# Patient Record
Sex: FEMALE | Race: WHITE | Employment: PART TIME | URBAN - METROPOLITAN AREA
[De-identification: names, ages, dates, MRNs, and addresses within clinical notes are randomized per-mention and may not be internally consistent; named-entity substitution may affect disease eponyms.]

---

## 2017-01-09 ENCOUNTER — HOSPITAL ENCOUNTER (OUTPATIENT)
Age: 32
Discharge: HOME | End: 2017-01-09
Attending: EMERGENCY MEDICINE
Payer: MEDICAID

## 2017-01-09 VITALS
TEMPERATURE: 98 F | RESPIRATION RATE: 20 BRPM | HEIGHT: 67 IN | OXYGEN SATURATION: 100 % | DIASTOLIC BLOOD PRESSURE: 81 MMHG | HEART RATE: 80 BPM | BODY MASS INDEX: 35.79 KG/M2 | SYSTOLIC BLOOD PRESSURE: 139 MMHG | WEIGHT: 228 LBS

## 2017-01-09 DIAGNOSIS — J01.90 ACUTE SINUSITIS, RECURRENCE NOT SPECIFIED, UNSPECIFIED LOCATION: Primary | ICD-10-CM

## 2017-01-09 PROCEDURE — 99214 OFFICE O/P EST MOD 30 MIN: CPT

## 2017-01-09 PROCEDURE — 99213 OFFICE O/P EST LOW 20 MIN: CPT

## 2017-01-09 RX ORDER — CEFUROXIME AXETIL 500 MG/1
500 TABLET ORAL 2 TIMES DAILY
Qty: 14 TABLET | Refills: 0 | Status: SHIPPED | OUTPATIENT
Start: 2017-01-09 | End: 2017-01-16

## 2017-01-09 NOTE — ED INITIAL ASSESSMENT (HPI)
Sick since 12/26 with sinus congestion. Mild cough. No fever. Bloody drainage at times. Frontal headache, facial pressure. No dizziness. + nausea. No vomiting.

## 2017-01-09 NOTE — ED PROVIDER NOTES
Patient Seen in: [address]    History   Patient presents with:  Sinus Problem    Stated Complaint: sinus problem    HPI    Patient is 68-year-old female that complains of 14 days of sinus congestion.   There has been no f Cholecalciferol (VITAMIN D) 2000 UNITS Oral Cap,  Take by mouth. 5-Hydroxytryptophan (5-HTP) 100 MG Oral Cap,  Take 2 capsules by mouth daily. TURMERIC OR,  Take  by mouth daily.    NON FORMULARY,  CBD oil, vapor, smoked to help with pain and inflammati round, and reactive to light. Neck: Neck supple. Cardiovascular: Normal rate, regular rhythm, normal heart sounds and intact distal pulses. Pulmonary/Chest: Effort normal and breath sounds normal. No respiratory distress. Abdominal: Soft.  Bowel so

## 2017-01-12 RX ORDER — LOSARTAN POTASSIUM 100 MG/1
TABLET ORAL
Qty: 30 TABLET | Refills: 0 | Status: SHIPPED | OUTPATIENT
Start: 2017-01-12 | End: 2017-02-13

## 2017-01-12 NOTE — TELEPHONE ENCOUNTER
. please advise as does not meet RN protocol for refill criteria- lov date         Hypertensive Medications  Protocol Criteria:  · Appointment scheduled in the past 6 months or in the next 3 months  · BMP or CMP in the past 12 months  · Creatinine result <

## 2017-01-18 ENCOUNTER — TELEPHONE (OUTPATIENT)
Dept: OBGYN CLINIC | Facility: CLINIC | Age: 32
End: 2017-01-18

## 2017-01-31 NOTE — TELEPHONE ENCOUNTER
PT DOES HAVE AN ANNUAL SCHEDULED WITH [facility] ON 3-1-17. LEFT MESSAGE TO CALL US BACK ONLY IF SHE HAS ANY ISSUES THAT NEED TO BE ADDRESSED BEFORE HER 3-1-17 APPT OR TO CALL BACK WITH ANY QUESTIONS.

## 2017-02-06 ENCOUNTER — TELEPHONE (OUTPATIENT)
Dept: INTERNAL MEDICINE CLINIC | Facility: CLINIC | Age: 32
End: 2017-02-06

## 2017-02-06 ENCOUNTER — TELEPHONE (OUTPATIENT)
Dept: OBGYN CLINIC | Facility: CLINIC | Age: 32
End: 2017-02-06

## 2017-02-06 DIAGNOSIS — N92.6 IRREGULAR MENSES: Primary | ICD-10-CM

## 2017-02-06 RX ORDER — NORETHINDRONE ACETATE AND ETHINYL ESTRADIOL .03; 1.5 MG/1; MG/1
1 TABLET ORAL DAILY
Qty: 28 TABLET | Refills: 0 | Status: SHIPPED | OUTPATIENT
Start: 2017-02-06 | End: 2017-02-13

## 2017-02-06 NOTE — TELEPHONE ENCOUNTER
Pt's last annual was on 2/15/16 with a normal pap. Pt has appt for annual on 3/1/17. One month sent to pharmacy to cover until appt with [facility]. Pt informed.

## 2017-02-06 NOTE — TELEPHONE ENCOUNTER
Current Outpatient Prescriptions:  Norethindrone Acet-Ethinyl Est (Ashley Pinto 1.5/30) 1.5-30 MG-MCG Oral Tab Take 1 tablet by mouth once daily.  Disp: 3 Package Rfl: 3   ANNUAL 3-1-17

## 2017-02-13 ENCOUNTER — OFFICE VISIT (OUTPATIENT)
Dept: INTERNAL MEDICINE CLINIC | Facility: CLINIC | Age: 32
End: 2017-02-13

## 2017-02-13 VITALS
HEART RATE: 101 BPM | BODY MASS INDEX: 36.16 KG/M2 | SYSTOLIC BLOOD PRESSURE: 137 MMHG | WEIGHT: 230.38 LBS | RESPIRATION RATE: 18 BRPM | HEIGHT: 67 IN | DIASTOLIC BLOOD PRESSURE: 91 MMHG

## 2017-02-13 DIAGNOSIS — I10 ESSENTIAL HYPERTENSION: Primary | ICD-10-CM

## 2017-02-13 DIAGNOSIS — J45.20 MILD INTERMITTENT ASTHMA WITHOUT COMPLICATION: ICD-10-CM

## 2017-02-13 DIAGNOSIS — M79.7 FIBROMYALGIA: ICD-10-CM

## 2017-02-13 DIAGNOSIS — F17.200 TOBACCO USE DISORDER: ICD-10-CM

## 2017-02-13 PROCEDURE — 99212 OFFICE O/P EST SF 10 MIN: CPT | Performed by: INTERNAL MEDICINE

## 2017-02-13 PROCEDURE — 99214 OFFICE O/P EST MOD 30 MIN: CPT | Performed by: INTERNAL MEDICINE

## 2017-02-13 RX ORDER — LOSARTAN POTASSIUM 100 MG/1
100 TABLET ORAL
Qty: 30 TABLET | Refills: 5 | Status: SHIPPED | OUTPATIENT
Start: 2017-02-13 | End: 2017-08-15

## 2017-02-13 RX ORDER — CARISOPRODOL 250 MG/1
1 TABLET ORAL NIGHTLY PRN
Qty: 30 TABLET | Refills: 3 | Status: SHIPPED | OUTPATIENT
Start: 2017-02-13

## 2017-02-13 RX ORDER — ALBUTEROL SULFATE 90 UG/1
2 AEROSOL, METERED RESPIRATORY (INHALATION) EVERY 6 HOURS PRN
Qty: 1 INHALER | Refills: 5 | Status: SHIPPED | OUTPATIENT
Start: 2017-02-13

## 2017-02-13 NOTE — PROGRESS NOTES
HPI:    Patient ID: Ralf Moran is a 36year old female. HPI Comments: She had a bad flare of her fibromyalgia last week. She hurt everywhere. She took ibuprofen  1000 mg twice a day for a few days. Fibromyalgia  This is a chronic problem.  The Sulfate HFA (VENTOLIN HFA) 108 (90 BASE) MCG/ACT Inhalation Aero Soln Inhale 2 puffs into the lungs every 6 (six) hours as needed for Wheezing.  Disp: 1 Inhaler Rfl: 0   Norethindrone Acet-Ethinyl Est (Kathryn Sensing 1.5/30) 1.5-30 MG-MCG Oral Tab Take 1 tablet by m the risk of all cancers. Advised that I would give medication to help her quit, if desired. Patient is unable to set a quit date at this time, but wants to try weaning.          [id number]  Discussed tobacco cessation with patient for more than 3 minutes and

## 2017-02-13 NOTE — PATIENT INSTRUCTIONS
Please rank your cigarettes from most to least important (ex: 1-10).   Give up least important cigarette every 3 days to gradually wean down the number.  (ex:  Give up 10th for 7 days, then 9th for 7 days.)      Getting Support for Quitting Smoking  You don Sometimes you may just need to talk when you miss smoking. Ex-smokers are good to talk to, because they’re likely to know how you feel. You may need extra support in the first few weeks after you quit.  Ask a friend to call you each day to see how you’re do

## 2017-02-17 ENCOUNTER — TELEPHONE (OUTPATIENT)
Dept: INTERNAL MEDICINE CLINIC | Facility: CLINIC | Age: 32
End: 2017-02-17

## 2017-02-17 NOTE — TELEPHONE ENCOUNTER
Pt. States that prior Rebekah Heman is needed for [facility] medication/muscle relaxer. Pt. Requesting to get a call back to find out when Rebekah Heman is given. Zoey Myles

## 2017-03-01 ENCOUNTER — OFFICE VISIT (OUTPATIENT)
Dept: OBGYN CLINIC | Facility: CLINIC | Age: 32
End: 2017-03-01

## 2017-03-01 VITALS
WEIGHT: 232 LBS | BODY MASS INDEX: 36 KG/M2 | SYSTOLIC BLOOD PRESSURE: 139 MMHG | HEART RATE: 111 BPM | DIASTOLIC BLOOD PRESSURE: 94 MMHG

## 2017-03-01 DIAGNOSIS — Z79.3 AMENORRHEA DUE TO ORAL CONTRACEPTIVE: ICD-10-CM

## 2017-03-01 DIAGNOSIS — N91.2 AMENORRHEA DUE TO ORAL CONTRACEPTIVE: ICD-10-CM

## 2017-03-01 DIAGNOSIS — Z01.419 ENCOUNTER FOR GYNECOLOGICAL EXAMINATION WITHOUT ABNORMAL FINDING: Primary | ICD-10-CM

## 2017-03-01 PROCEDURE — 99395 PREV VISIT EST AGE 18-39: CPT | Performed by: OBSTETRICS & GYNECOLOGY

## 2017-03-01 RX ORDER — NORETHINDRONE ACETATE AND ETHINYL ESTRADIOL .03; 1.5 MG/1; MG/1
1 TABLET ORAL
Qty: 3 PACKAGE | Refills: 3 | Status: SHIPPED | OUTPATIENT
Start: 2017-03-01 | End: 2017-03-06

## 2017-03-01 RX ORDER — METHOCARBAMOL 500 MG/1
500 TABLET, FILM COATED ORAL 4 TIMES DAILY
Qty: 30 TABLET | Refills: 1 | Status: SHIPPED | OUTPATIENT
Start: 2017-03-01 | End: 2017-09-01

## 2017-03-01 NOTE — TELEPHONE ENCOUNTER
Pt calling on PA  Said LV also prescribed another medication for muscle spasms-did not know name of medication  Said ins  requires PA for this medicaiton too

## 2017-03-01 NOTE — TELEPHONE ENCOUNTER
Call ptIdalia Altamirano is not covered by her insurance. I changed it to Robaxin. Please schedule a f/u appointment if the Robaxin is not effective.

## 2017-03-01 NOTE — TELEPHONE ENCOUNTER
Soma denied. Plan states use preferred drug list medications; parafon forte, baclofen, robaxin, or zanaflex.

## 2017-03-01 NOTE — TELEPHONE ENCOUNTER
Spoke with patient advised her Beryl Code was denied. Patient advised new RX for Robaxin was sent to her pharmacy. Patient voiced her understanding of this.

## 2017-03-06 RX ORDER — NORETHINDRONE ACETATE AND ETHINYL ESTRADIOL .03; 1.5 MG/1; MG/1
1 TABLET ORAL
Qty: 3 PACKAGE | Refills: 3 | Status: SHIPPED | OUTPATIENT
Start: 2017-03-06

## 2017-03-06 NOTE — PROGRESS NOTES
Rahul Medina is a 36year old female [facility] Patient's last menstrual period was 12/08/2016.   Patient presents with:  Gyn Exam: annual  she does not have menses on ocps and we discussed that this is a common side effect and reassured her that I would l average. Drug Use: Yes     Comment: marijuana to help with back pain    Sexual Activity: Yes    Partners: Male    Birth Control/ Protection: Condom, OCP     Other Topics Concern    Caffeine Concern No    Comment: 1 cup coffee daily.  1 large cup tea andrés Disp: , Rfl:     ALLERGIES:    Amoclan                 Diarrhea  Guaifenesin             Nausea and vomiting  Peach                     Peanuts                       Review of Systems:  Constitutional:  Denies fatigue, night sweats, hot flashes  Eyes:  den without tenderness  Adnexa: normal without masses or tenderness, exam limited by body habitus  Perineum: normal  Anus: no hemorroids     Assessment & Plan:    Encounter for gynecological examination without abnormal finding  (primary encounter diagnosis)

## 2017-07-18 ENCOUNTER — HOSPITAL (OUTPATIENT)
Dept: OTHER | Age: 32
End: 2017-07-18
Attending: EMERGENCY MEDICINE

## 2017-08-14 DIAGNOSIS — I10 ESSENTIAL HYPERTENSION: ICD-10-CM

## 2017-08-15 RX ORDER — LOSARTAN POTASSIUM 100 MG/1
TABLET ORAL
Qty: 30 TABLET | Refills: 0 | Status: SHIPPED | OUTPATIENT
Start: 2017-08-15 | End: 2017-09-17

## 2017-09-05 RX ORDER — METHOCARBAMOL 500 MG/1
500 TABLET, FILM COATED ORAL 4 TIMES DAILY
Qty: 30 TABLET | Refills: 0 | Status: SHIPPED
Start: 2017-09-05

## 2017-09-05 NOTE — TELEPHONE ENCOUNTER
From: Yamil Baldwin  Sent: 9/1/2017 8:01 PM CDT  Subject: Medication Renewal Request    Yamil Baldwin would like a refill of the following medications:  methocarbamol (ROBAXIN) 500 MG Oral Tab Kami Landa MD]    Preferred pharmacy: [facility]/PHARMACY

## 2017-09-05 NOTE — TELEPHONE ENCOUNTER
Refill Protocol Appointment Criteria  · Appointment scheduled in the past 6 months or in the next 3 months  Recent Outpatient Visits            6 months ago Encounter for gynecological examination without abnormal finding    [facility]

## 2017-09-06 ENCOUNTER — OFFICE VISIT (OUTPATIENT)
Dept: FAMILY MEDICINE CLINIC | Facility: CLINIC | Age: 32
End: 2017-09-06

## 2017-09-06 VITALS
HEART RATE: 84 BPM | OXYGEN SATURATION: 98 % | DIASTOLIC BLOOD PRESSURE: 84 MMHG | SYSTOLIC BLOOD PRESSURE: 146 MMHG | TEMPERATURE: 98 F | RESPIRATION RATE: 16 BRPM

## 2017-09-06 DIAGNOSIS — Z20.818 STREP THROAT EXPOSURE: ICD-10-CM

## 2017-09-06 DIAGNOSIS — I10 HYPERTENSION, UNSPECIFIED TYPE: ICD-10-CM

## 2017-09-06 DIAGNOSIS — J02.9 SORE THROAT: Primary | ICD-10-CM

## 2017-09-06 DIAGNOSIS — J01.00 ACUTE MAXILLARY SINUSITIS, RECURRENCE NOT SPECIFIED: ICD-10-CM

## 2017-09-06 LAB
CONTROL LINE PRESENT WITH A CLEAR BACKGROUND (YES/NO): YES YES/NO
STREP GRP A CUL-SCR: NEGATIVE

## 2017-09-06 PROCEDURE — 99213 OFFICE O/P EST LOW 20 MIN: CPT | Performed by: NURSE PRACTITIONER

## 2017-09-06 PROCEDURE — 87880 STREP A ASSAY W/OPTIC: CPT | Performed by: NURSE PRACTITIONER

## 2017-09-06 RX ORDER — CEFDINIR 300 MG/1
CAPSULE ORAL
Qty: 20 CAPSULE | Refills: 0 | Status: SHIPPED | OUTPATIENT
Start: 2017-09-06

## 2017-09-06 NOTE — PATIENT INSTRUCTIONS
When You Have a Sore Throat  A sore throat can be painful. There are many reasons why you may have a sore throat. Your healthcare provider will work with you to find the cause of your sore throat. He or she will also find the best treatment for you. During the exam, your healthcare provider checks your ears, nose, and throat for problems.  He or she also checks for swelling in the neck, and may listen to your chest.  Possible tests  Other tests your healthcare provider may perform include:  · A throat If your sore throat is due to a bacterial infection, antibiotics may speed healing and prevent complications.  Although group A streptococcus (\"strep throat\" or GAS) is the major treatable infection for a sore throat, GAS causes only 5% to 15% of sore thr © 0182-1504 [address], [address] [place]. All rights reserved. This information is not intended as a substitute for professional medical care. Always follow your healthcare professional's instructions.         Sinusit · Over-the-counter decongestants may be used unless a similar medicine was prescribed. Nasal sprays work the fastest. Use one that contains phenylephrine or oxymetazoline. First blow the nose gently. Then use the spray.  Do not use these medicines more ofte © 5498-9022 [address], [address] [place]. All rights reserved. This information is not intended as a substitute for professional medical care. Always follow your healthcare professional's instructions.

## 2017-09-06 NOTE — PROGRESS NOTES
CHIEF COMPLAINT:   Patient presents with:  Sinus Problem: Sinus symptoms, sore throat, exposure to strep. Sxs x1 week. HPI:   Rubio Leija is a 29year old female who presents for sore throat, sinus symptoms for  1  weeks.  Symptoms have been wors Albuterol Sulfate HFA (VENTOLIN HFA) 108 (90 Base) MCG/ACT Inhalation Aero Soln Inhale 2 puffs into the lungs every 6 (six) hours as needed for Wheezing.  Disp: 1 Inhaler Rfl: 5   Carisoprodol (SOMA) 250 MG Oral Tab Take 1 tablet (250 mg total) by mouth nig SKIN: no rashes or abnormal skin lesions  HEENT: See HPI. LUNGS: denies shortness of breath or wheezing, See HPI  CARDIOVASCULAR: denies chest pain or palpitations   GI: denies N/V/C or abdominal pain  NEURO: No numbness or tingling in face.     EXAM: - Advised F/U visit if no improvement/worsening within 3-5 days; sooner if new fever or worsening breathing. To ER if ever dyspnea, SOB, chest pain, severe HA, or vision change. - Pt verbalizes understanding and is agreeable w/ plan.     Meds & Refills fo Your healthcare provider may ask you the following:  · How long has the sore throat lasted and how have you been treating it? · Do you have any other symptoms, such as body aches, fever, or cough? · Does your sore throat recur? If so, how often?  How many · Try over-the-counter pain relievers such as acetaminophen or ibuprofen. Use as directed, and don’t exceed the recommended dose. Don’t give aspirin to children. Are antibiotics needed?   If your sore throat is due to a bacterial infection, antibiotics ma · Symptoms don’t improve within 2 to 3 days of starting antibiotics. Date Last Reviewed: 10/1/2016  © 7589-8714 [address], [address] [place] [address]. All rights reserved.  This information is not intended as a substitute fo · Over-the-counter decongestants may be used unless a similar medicine was prescribed. Nasal sprays work the fastest. Use one that contains phenylephrine or oxymetazoline. First blow the nose gently. Then use the spray.  Do not use these medicines more ofte © 3540-5388 [address], [address] [place]. All rights reserved. This information is not intended as a substitute for professional medical care. Always follow your healthcare professional's instructions.

## 2017-09-17 DIAGNOSIS — I10 ESSENTIAL HYPERTENSION: ICD-10-CM

## 2017-09-19 DIAGNOSIS — I10 ESSENTIAL HYPERTENSION: ICD-10-CM

## 2017-09-19 RX ORDER — LOSARTAN POTASSIUM 100 MG/1
TABLET ORAL
Qty: 30 TABLET | Refills: 0 | Status: CANCELLED
Start: 2017-09-19

## 2017-09-21 RX ORDER — LOSARTAN POTASSIUM 100 MG/1
TABLET ORAL
Qty: 30 TABLET | Refills: 0 | Status: SHIPPED | OUTPATIENT
Start: 2017-09-21

## 2017-09-21 NOTE — TELEPHONE ENCOUNTER
Per last OV notes, was to come back in August for complete physical.   Chart reviewed. Refills sent per Triage Dept protocol. Will send patient reminder.    Hypertensive Medications  Protocol Criteria:  · Appointment scheduled in the past 6 months or in t

## 2017-09-21 NOTE — TELEPHONE ENCOUNTER
From: Carlos Eduardo Richard  Sent: 9/19/2017 10:11 AM CDT  Subject: Medication Renewal Request    Carlos Eduardo Richard would like a refill of the following medications:     losartan 100 MG Oral Tab Krista Diaz MD]    Preferred pharmacy: [facility]/PHARMACY #[id number] - [place]

## 2017-09-21 NOTE — TELEPHONE ENCOUNTER
From: Angi Yoon  Sent: 9/17/2017 7:01 AM CDT  Subject: Medication Renewal Request    Angi Yoon would like a refill of the following medications:     losartan 100 MG Oral Tab Cathie Swenson MD]    Preferred pharmacy: [facility]/PHARMACY #[id number] - [place]

## 2017-10-14 ENCOUNTER — PATIENT MESSAGE (OUTPATIENT)
Dept: OBGYN CLINIC | Facility: CLINIC | Age: 32
End: 2017-10-14

## 2017-10-16 NOTE — TELEPHONE ENCOUNTER
From: Yamil Baldwin  To: Sandi Childers MD  Sent: 10/14/2017 9:55 PM CDT  Subject: Prescription Question    Is there any way we could possibly try a different birth control I have still. been noticing alot of side effect and have not been having regula

## 2017-10-18 NOTE — TELEPHONE ENCOUNTER
Informed pt that [facility] sent a different ocp to her pharmacy for her  to start at the end of her current pack. Informed pt that [facility] explained to the pt at the visit the amenorrhea on the pill is common.   Informed pt that [facility] sent a birth control with more est

## 2017-10-18 NOTE — TELEPHONE ENCOUNTER
I sent an rx for a different ocp to her pharmacy for her to start at the end of her current pack.   I explained to pt at visit the amenorrhea on the pill is common in some women and no need for concern but I can give her a pill with more estrogen in it to t

## 2017-10-30 ENCOUNTER — HOSPITAL ENCOUNTER (EMERGENCY)
Facility: HOSPITAL | Age: 32
Discharge: HOME | End: 2017-10-30
Attending: EMERGENCY MEDICINE
Payer: COMMERCIAL

## 2017-10-30 VITALS
OXYGEN SATURATION: 98 % | HEIGHT: 67 IN | BODY MASS INDEX: 36.1 KG/M2 | SYSTOLIC BLOOD PRESSURE: 158 MMHG | WEIGHT: 230 LBS | DIASTOLIC BLOOD PRESSURE: 100 MMHG | HEART RATE: 87 BPM | TEMPERATURE: 98 F | RESPIRATION RATE: 18 BRPM

## 2017-10-30 DIAGNOSIS — J01.00 ACUTE NON-RECURRENT MAXILLARY SINUSITIS: Primary | ICD-10-CM

## 2017-10-30 PROCEDURE — 99283 EMERGENCY DEPT VISIT LOW MDM: CPT

## 2017-10-30 RX ORDER — AMOXICILLIN 500 MG/1
500 TABLET, FILM COATED ORAL 3 TIMES DAILY
Qty: 42 TABLET | Refills: 0 | Status: SHIPPED | OUTPATIENT
Start: 2017-10-30 | End: 2017-11-13

## 2017-10-30 NOTE — ED PROVIDER NOTES
Patient Seen in: [facility] AND [facility] Emergency Department    History   Patient presents with:  Cough/URI    Stated Complaint: nausea, headache    HPI    22-year-old female with history of fibromyalgia and hypertension presents with complaints of congestion average. Review of Systems    Positive for stated complaint: nausea, headache  Other systems are as noted in HPI. Constitutional and vital signs reviewed. All other systems reviewed and negative except as noted above.     PS[facility] elements reviewed f diagnosis)    Disposition:  Discharge    Follow-up:  Hanh Mcnamara [id number]  [phone]            Medications Prescribed:  Current Discharge Medication List    START taking these medications    amoxicillin 500 MG Oral

## 2017-11-09 ENCOUNTER — HOSPITAL (OUTPATIENT)
Dept: OTHER | Age: 32
End: 2017-11-09
Attending: EMERGENCY MEDICINE

## 2018-01-09 RX ORDER — NORETHINDRONE AND ETHINYL ESTRADIOL 1 MG-35MCG
1 KIT ORAL DAILY
Qty: 84 TABLET | Refills: 0 | OUTPATIENT
Start: 2018-01-09 | End: 2019-01-09

## 2018-01-09 NOTE — TELEPHONE ENCOUNTER
Regarding: RE: Prescription Question  Contact: [phone]  ----- Message from Karen Mcarthur RN sent at 1/8/2018  5:44 PM CST -----       ----- Message sent from Kofi Smith RN to Paul Luiz at 1/8/2018  5:43 PM -----   Fredis Cullen,    Glad to hear you are doing well while on the birth control. Your message will be sent to Dr. Darryle Lang as an [place] and will also ask if it is ok for refills to cover you until your next annual in March.     ----- Message -----     From: Paul Dee     Sent: 1/8/2018  4:12 PM CST       To: Jaiden Josue.  MD Omayra  Subject: Prescription Question    Hey you said to let you know how my bc is doing and I've definitely been feeling better on it

## 2018-01-09 NOTE — TELEPHONE ENCOUNTER
SEE MY CHART MESSAGE. LAST ANNUAL 3-1-17 AND LAST PAP 2-15-16. PT HAS [employer] INSURANCE. WILL YOU REFILL #84 AGAIN? RX PENDED TO [facility].

## 2019-04-24 ENCOUNTER — HOSPITAL (OUTPATIENT)
Dept: OTHER | Age: 34
End: 2019-04-24
Attending: EMERGENCY MEDICINE

## 2019-07-29 ENCOUNTER — HOSPITAL (OUTPATIENT)
Dept: OTHER | Age: 34
End: 2019-07-29

## 2019-07-29 LAB
ANALYZER ANC (IANC): ABNORMAL
ANION GAP SERPL CALC-SCNC: 9 MMOL/L (ref 10–20)
BASOPHILS # BLD: 0.1 K/MCL (ref 0–0.3)
BASOPHILS NFR BLD: 1 %
BUN SERPL-MCNC: 9 MG/DL (ref 6–20)
BUN/CREAT SERPL: 12 (ref 7–25)
CALCIUM SERPL-MCNC: 9.1 MG/DL (ref 8.4–10.2)
CHLORIDE SERPL-SCNC: 109 MMOL/L (ref 98–107)
CO2 SERPL-SCNC: 26 MMOL/L (ref 21–32)
CREAT SERPL-MCNC: 0.76 MG/DL (ref 0.51–0.95)
DIFFERENTIAL METHOD BLD: ABNORMAL
EOSINOPHIL # BLD: 0.2 K/MCL (ref 0.1–0.5)
EOSINOPHIL NFR BLD: 2 %
ERYTHROCYTE [DISTWIDTH] IN BLOOD: 13 % (ref 11–15)
GLUCOSE SERPL-MCNC: 94 MG/DL (ref 65–99)
HCT VFR BLD CALC: 43.4 % (ref 36–46.5)
HGB BLD-MCNC: 14.3 G/DL (ref 12–15.5)
IMM GRANULOCYTES # BLD AUTO: 0 K/MCL (ref 0–0.2)
IMM GRANULOCYTES NFR BLD: 0 %
LYMPHOCYTES # BLD: 1.2 K/MCL (ref 1–4.8)
LYMPHOCYTES NFR BLD: 12 %
MCH RBC QN AUTO: 29 PG (ref 26–34)
MCHC RBC AUTO-ENTMCNC: 32.9 G/DL (ref 32–36.5)
MCV RBC AUTO: 88 FL (ref 78–100)
MONOCYTES # BLD: 0.5 K/MCL (ref 0.3–0.9)
MONOCYTES NFR BLD: 5 %
NEUTROPHILS # BLD: 8.5 K/MCL (ref 1.8–7.7)
NEUTROPHILS NFR BLD: 80 %
NEUTS SEG NFR BLD: ABNORMAL %
NRBC (NRBCRE): 0 /100 WBC
PLATELET # BLD: 288 K/MCL (ref 140–450)
POTASSIUM SERPL-SCNC: 3.8 MMOL/L (ref 3.4–5.1)
RBC # BLD: 4.93 MIL/MCL (ref 4–5.2)
SODIUM SERPL-SCNC: 140 MMOL/L (ref 135–145)
TROPONIN I SERPL HS-MCNC: <0.02 NG/ML
WBC # BLD: 10.5 K/MCL (ref 4.2–11)

## 2025-08-07 ENCOUNTER — HOSPITAL ENCOUNTER (EMERGENCY)
Facility: HOSPITAL | Age: 40
Discharge: HOME | End: 2025-08-07
Attending: EMERGENCY MEDICINE

## 2025-08-07 VITALS
HEART RATE: 95 BPM | DIASTOLIC BLOOD PRESSURE: 105 MMHG | BODY MASS INDEX: 42.38 KG/M2 | RESPIRATION RATE: 17 BRPM | TEMPERATURE: 98 F | SYSTOLIC BLOOD PRESSURE: 177 MMHG | HEIGHT: 67 IN | OXYGEN SATURATION: 96 % | WEIGHT: 270 LBS

## 2025-08-07 DIAGNOSIS — J01.91 ACUTE RECURRENT SINUSITIS, UNSPECIFIED LOCATION: Primary | ICD-10-CM

## 2025-08-07 DIAGNOSIS — R03.0 ELEVATED BLOOD PRESSURE READING: ICD-10-CM

## 2025-08-07 LAB
ATRIAL RATE: 103 BPM
P AXIS: 52 DEGREES
P-R INTERVAL: 142 MS
Q-T INTERVAL: 352 MS
QRS DURATION: 82 MS
QTC CALCULATION (BEZET): 461 MS
R AXIS: 9 DEGREES
T AXIS: 62 DEGREES
VENTRICULAR RATE: 103 BPM

## 2025-08-07 PROCEDURE — 99283 EMERGENCY DEPT VISIT LOW MDM: CPT

## 2025-08-07 PROCEDURE — 93010 ELECTROCARDIOGRAM REPORT: CPT

## 2025-08-07 PROCEDURE — 93005 ELECTROCARDIOGRAM TRACING: CPT

## 2025-08-07 RX ORDER — ACETAMINOPHEN 500 MG
1000 TABLET ORAL ONCE
Status: COMPLETED | OUTPATIENT
Start: 2025-08-07 | End: 2025-08-07

## 2025-08-07 RX ORDER — METHYLPREDNISOLONE 4 MG/1
TABLET ORAL
Qty: 1 EACH | Refills: 0 | Status: SHIPPED | OUTPATIENT
Start: 2025-08-07

## 2025-08-07 RX ORDER — HYDROCODONE BITARTRATE AND ACETAMINOPHEN 5; 325 MG/1; MG/1
1 TABLET ORAL EVERY 6 HOURS PRN
Qty: 10 TABLET | Refills: 0 | Status: SHIPPED | OUTPATIENT
Start: 2025-08-07 | End: 2025-08-07

## 2025-08-07 RX ORDER — GARLIC EXTRACT 500 MG
1 CAPSULE ORAL DAILY
Qty: 10 CAPSULE | Refills: 0 | Status: SHIPPED | OUTPATIENT
Start: 2025-08-07 | End: 2025-08-17

## 2025-08-07 RX ORDER — IBUPROFEN 600 MG/1
600 TABLET, FILM COATED ORAL ONCE
Status: COMPLETED | OUTPATIENT
Start: 2025-08-07 | End: 2025-08-07

## (undated) NOTE — LETTER
October 30, 2017    Patient: Mars June   Date of Visit: 10/30/2017       To Whom It May Concern: Ricarda Levy was seen and treated in our emergency department on 10/30/2017. She should not return to work until 10/31/2017.     If you have any

## (undated) NOTE — ED AVS SNAPSHOT
Ralf Moran   MRN: [id number]    Department:  [facility] Emergency Department   Date of Visit:  10/30/2017           Disclosure     Insurance plans vary and the physician(s) referred by the ER may not be covered by your plan.  Please contac CARE PHYSICIAN AT ONCE OR RETURN IMMEDIATELY TO THE EMERGENCY DEPARTMENT. If you have been prescribed any medication(s), please fill your prescription right away and begin taking the medication(s) as directed.   If you believe that any of the medications

## (undated) NOTE — ED AVS SNAPSHOT
[facility] AND [facility] in [address] [address] [place] Jarvis    Phone:  [phone]    Fax:  [phone]           Osiel Harman   MRN: [id number]    Department:  [facility] AND [facility] in [address] [place] [address]   Date of Visit: may not be covered by your plan. It is possible that the physician may not participate in your health insurance plan. This may result in a lower benefit level being available to you or other limited reimbursement.   The physician may seek payment directly If you have been prescribed any medication(s), please fill your prescription right away and begin taking the medication(s) as directed.   If you believe that any of the medications or instructions on this list is different from what your Primary Care doctor Patient [address] to help you get signed up for insurance coverage. Patient [address] is a Federal Navigator program that can help with your Affordable Care Act coverage, as well as all types of Medicaid plans.   To get signed up and covere

## (undated) NOTE — MR AVS SNAPSHOT
[place]  [address]  [phone]               Thank you for choosing us for your health care visit with Matilde Hahn.  MD Omayra.  We are glad to serve you and happy to provide you with this summa Take 1 tablet (500 mg total) by mouth 4 (four) times daily. Commonly known as:  ROBAXIN           NON FORMULARY   CBD oil, vapor, smoked to help with pain and inflammation.            Norethindrone Acet-Ethinyl Est 1.5-30 MG-MCG Tabs   Take 1 tablet by mo

## (undated) NOTE — Clinical Note
ASTHMA ACTION PLAN for Jenna Gomez     : 1985     Date: 2017  Doctor:  Ric Ravi MD  Phone for doctor or clinic: [facility], [address] [facility], [address] [place] [address] Albuterol inhaler 2 puffs every 20 minutes for three treatments       Don't forget:  · Rinse mouth after using inhaler  · Use spacer for inhaler  · Remember to get your Flu vaccine every fall!      Asthma Action Plan reviewed with the caregiver and patient,